# Patient Record
Sex: MALE | Race: BLACK OR AFRICAN AMERICAN | NOT HISPANIC OR LATINO | Employment: STUDENT | URBAN - METROPOLITAN AREA
[De-identification: names, ages, dates, MRNs, and addresses within clinical notes are randomized per-mention and may not be internally consistent; named-entity substitution may affect disease eponyms.]

---
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
## 2025-02-21 ENCOUNTER — HOSPITAL ENCOUNTER (EMERGENCY)
Facility: HOSPITAL | Age: 20
Discharge: HOME | End: 2025-02-21
Attending: INTERNAL MEDICINE
Payer: MEDICAID

## 2025-02-21 VITALS
OXYGEN SATURATION: 98 % | HEART RATE: 116 BPM | TEMPERATURE: 102 F | BODY MASS INDEX: 22.76 KG/M2 | RESPIRATION RATE: 20 BRPM | HEIGHT: 67 IN | DIASTOLIC BLOOD PRESSURE: 63 MMHG | WEIGHT: 145 LBS | SYSTOLIC BLOOD PRESSURE: 105 MMHG

## 2025-02-21 DIAGNOSIS — J10.1 INFLUENZA A: Primary | ICD-10-CM

## 2025-02-21 LAB
CTP QC/QA: YES
INFLUENZA A ANTIGEN, POC: POSITIVE
INFLUENZA B ANTIGEN, POC: NEGATIVE
POC RAPID STREP A: NEGATIVE
SARS-COV-2 RDRP RESP QL NAA+PROBE: NEGATIVE

## 2025-02-21 PROCEDURE — 87635 SARS-COV-2 COVID-19 AMP PRB: CPT | Mod: ER | Performed by: INTERNAL MEDICINE

## 2025-02-21 PROCEDURE — 99284 EMERGENCY DEPT VISIT MOD MDM: CPT | Mod: ER

## 2025-02-21 PROCEDURE — 87804 INFLUENZA ASSAY W/OPTIC: CPT | Mod: ER

## 2025-02-21 PROCEDURE — 25000003 PHARM REV CODE 250: Mod: ER | Performed by: INTERNAL MEDICINE

## 2025-02-21 PROCEDURE — 87880 STREP A ASSAY W/OPTIC: CPT | Mod: ER

## 2025-02-21 RX ORDER — IBUPROFEN 600 MG/1
600 TABLET ORAL EVERY 6 HOURS PRN
Qty: 20 TABLET | Refills: 0 | Status: SHIPPED | OUTPATIENT
Start: 2025-02-21

## 2025-02-21 RX ORDER — ACETAMINOPHEN 500 MG
500 TABLET ORAL EVERY 6 HOURS PRN
Qty: 20 TABLET | Refills: 0 | Status: SHIPPED | OUTPATIENT
Start: 2025-02-21

## 2025-02-21 RX ORDER — CETIRIZINE HYDROCHLORIDE 10 MG/1
10 TABLET ORAL DAILY
Qty: 30 TABLET | Refills: 0 | Status: SHIPPED | OUTPATIENT
Start: 2025-02-21 | End: 2025-03-23

## 2025-02-21 RX ORDER — ACETAMINOPHEN 500 MG
1000 TABLET ORAL
Status: COMPLETED | OUTPATIENT
Start: 2025-02-21 | End: 2025-02-21

## 2025-02-21 RX ORDER — OSELTAMIVIR PHOSPHATE 75 MG/1
75 CAPSULE ORAL 2 TIMES DAILY
Qty: 10 CAPSULE | Refills: 0 | Status: SHIPPED | OUTPATIENT
Start: 2025-02-21 | End: 2025-02-26

## 2025-02-21 RX ORDER — GUAIFENESIN 100 MG/5ML
100-200 SOLUTION ORAL EVERY 4 HOURS PRN
Qty: 60 ML | Refills: 0 | Status: SHIPPED | OUTPATIENT
Start: 2025-02-21 | End: 2025-03-03

## 2025-02-21 RX ORDER — BENZONATATE 100 MG/1
100 CAPSULE ORAL 3 TIMES DAILY PRN
Qty: 20 CAPSULE | Refills: 0 | Status: SHIPPED | OUTPATIENT
Start: 2025-02-21 | End: 2025-03-03

## 2025-02-21 RX ORDER — IBUPROFEN 600 MG/1
600 TABLET ORAL
Status: COMPLETED | OUTPATIENT
Start: 2025-02-21 | End: 2025-02-21

## 2025-02-21 RX ORDER — FLUTICASONE PROPIONATE 50 MCG
1 SPRAY, SUSPENSION (ML) NASAL 2 TIMES DAILY PRN
Qty: 15 G | Refills: 0 | Status: SHIPPED | OUTPATIENT
Start: 2025-02-21

## 2025-02-21 RX ADMIN — IBUPROFEN 600 MG: 600 TABLET, FILM COATED ORAL at 08:02

## 2025-02-21 RX ADMIN — ACETAMINOPHEN 1000 MG: 500 TABLET, FILM COATED ORAL at 08:02

## 2025-02-21 NOTE — Clinical Note
"Dimitrios Rocha" Katie was seen and treated in our emergency department on 2/21/2025.  He may return to work on 02/26/2025.       If you have any questions or concerns, please don't hesitate to call.      Chucky KO RN    "

## 2025-02-21 NOTE — Clinical Note
"Dimitrios Rocha" Katie was seen and treated in our emergency department on 2/21/2025.  He may return to work on 02/25/2025.       If you have any questions or concerns, please don't hesitate to call.      Luis Huang PA-C"

## 2025-02-22 NOTE — ED PROVIDER NOTES
Encounter Date: 2/21/2025       History     Chief Complaint   Patient presents with    Sore Throat     Pt c/o sore throat x 2 days with chest congestion and fatigue     Patient is a 19-year-old male with a past medical history of thrombocytopenia who presents to the emergency department for evaluation of URI symptoms x 2 days.  Symptoms include cough, congestion, fatigue, sore throat, body aches.  Reports known sick contacts, states mom sick with same symptoms.  Up-to-date on vaccinations.  Denies nausea, vomiting, diarrhea.  Denies chest pain or shortness of breath.  No other complaints today.    The history is provided by the patient.     Review of patient's allergies indicates:  No Known Allergies  Past Medical History:   Diagnosis Date    Hypophosphatemia 06/02/2024    Thrombocytopenia 06/02/2024     No past surgical history on file.  No family history on file.  Social History[1]  Review of Systems   Constitutional:  Positive for chills and fever.   HENT:  Positive for congestion, rhinorrhea and sore throat. Negative for ear pain and trouble swallowing.    Respiratory:  Positive for cough. Negative for shortness of breath.    Cardiovascular:  Negative for chest pain.   Gastrointestinal:  Negative for abdominal pain, diarrhea, nausea and vomiting.   Genitourinary:  Negative for decreased urine volume.   Musculoskeletal:  Negative for back pain, neck pain and neck stiffness.   Neurological:  Negative for dizziness, light-headedness and headaches.       Physical Exam     Initial Vitals [02/21/25 2039]   BP Pulse Resp Temp SpO2   105/63 (!) 123 20 (!) 102.8 °F (39.3 °C) 98 %      MAP       --         Physical Exam    Nursing note and vitals reviewed.  Constitutional: He appears well-developed and well-nourished.   HENT:   Head: Normocephalic and atraumatic.   Right Ear: External ear normal.   Left Ear: External ear normal.   There is no posterior oropharyngeal erythema however postnasal drip is present, no tonsillar  swelling, no oropharyngeal exudates, uvula is midline. Normal dentition. No trismus.  No muffled voice. No submandibular swelling. Patient is tolerating secretions without difficulty.  Patient is speaking in full sentences on exam without difficulty.  Bilateral tympanic membranes are pearly gray without erythema, bulging, perforation.  There is no postauricular swelling, or overlying erythema or tenderness to palpation over mastoids bilaterally.    Neck: Carotid bruit is not present.   Normal range of motion.  Cardiovascular:  Regular rhythm, normal heart sounds and intact distal pulses.   Tachycardia present.   Exam reveals no gallop and no friction rub.       No murmur heard.  Pulmonary/Chest: Breath sounds normal. No respiratory distress. He has no wheezes. He has no rhonchi. He has no rales.   Abdominal: Abdomen is soft. Bowel sounds are normal. He exhibits no distension. There is no abdominal tenderness. There is no rebound and no guarding.   Musculoskeletal:         General: Normal range of motion.      Cervical back: Normal range of motion.     Neurological: He is alert and oriented to person, place, and time. GCS score is 15. GCS eye subscore is 4. GCS verbal subscore is 5. GCS motor subscore is 6.   Psychiatric: He has a normal mood and affect.         ED Course   Procedures  Labs Reviewed   POCT RAPID INFLUENZA A/B - Abnormal       Result Value    Influenza B Ag negative      Inflenza A Ag positive (*)    SARS-COV-2 RDRP GENE    POC Rapid COVID Negative       Acceptable Yes      Narrative:     This test utilizes isothermal nucleic acid amplification technology to detect the SARS-CoV-2 RdRp nucleic acid segment. The analytical sensitivity (limit of detection) is 500 copies/swab.     A POSITIVE result is indicative of the presence of SARS-CoV-2 RNA; clinical correlation with patient history and other diagnostic information is necessary to determine patient infection status.    A NEGATIVE  result means that SARS-CoV-2 nucleic acids are not present above the limit of detection. A NEGATIVE result should be treated as presumptive. It does not rule out the possibility of COVID-19 and should not be the sole basis for treatment decisions. If COVID-19 is strongly suspected based on clinical and exposure history, re-testing using an alternate molecular assay should be considered.     Commercial kits are provided by [employer].       POCT STREP A MOLECULAR   POCT INFLUENZA A/B MOLECULAR   POCT STREP A, RAPID    POC Rapid Strep A negative            Imaging Results    None          Medications   acetaminophen tablet 1,000 mg (1,000 mg Oral Given 2/21/25 2049)   ibuprofen tablet 600 mg (600 mg Oral Given 2/21/25 2048)     Medical Decision Making  This is an emergent evaluation of a 19-year-old male with a past medical history of thrombocytopenia who presents to the emergency department for evaluation of URI symptoms x 2 days.    Patient looks well clinically. There is no posterior oropharyngeal erythema however postnasal drip is present, no tonsillar swelling, no oropharyngeal exudates, uvula is midline. Normal dentition. No trismus.  No muffled voice. No submandibular swelling. Patient is tolerating secretions without difficulty.  Patient is speaking in full sentences on exam without difficulty.  Bilateral tympanic membranes are pearly gray without erythema, bulging, perforation.  There is no postauricular swelling, or overlying erythema or tenderness to palpation over mastoids bilaterally.  Regular rhythm, slightly tachycardic, no murmurs.  No carotid bruits appreciated on exam. Lungs are clear to auscultation bilaterally.  Abdomen is soft, nontender, non distended, with normal bowel sounds.     Differential diagnosis includes but is not limited to COVID, flu, strep, other viral syndrome.  Considered but doubt pneumonia, peritonsillar abscess, Ron's angina.    Workup initiated with viral swabs.   Tylenol, Motrin ordered from triage.  Vital signs, chart, labs, and/or imaging were all reviewed.  See ED course below and interpretations above. My overall impression is influenza. Will discharge home with Tamiflu, other symptomatic medications. Patient is very well appearing, and in no acute distress. Vital signs are reassuring here in the emergency department, patient is afebrile, breathing comfortable, satting 98 % on room air. Patient/Caregiver is stable for discharge at this time.  Patient/Caregiver was informed of results and plan of care. Patient/Caregiver verbalized understanding of care plan. All questions and concerns were addressed. Discussed strict return precautions with the patient/caregiver. Instructed follow up with primary care provider within 1 week.      Luis Huang PA-C    DISCLAIMER: This note was prepared with [employer] voice recognition transcription software. Garbled syntax, mangled pronouns, and other bizarre constructions may be attributed to that software system.       Amount and/or Complexity of Data Reviewed  Labs: ordered. Decision-making details documented in ED Course.    Risk  OTC drugs.  Prescription drug management.               ED Course as of 02/21/25 2130 Fri Feb 21, 2025 2050 BP: 105/63 [TM]   2050 Temp(!): 102.8 °F (39.3 °C) [TM]   2050 Pulse(!): 123 [TM]   2050 Resp: 20 [TM]   2050 SpO2: 98 % [TM]   2058 POCT Rapid Strep A  Negative. [TM]   2105 POCT Rapid Influenza A/B(!)  Influenza A positive. [TM]   2111 POCT COVID-19 Rapid Screening  COVID negative [TM]   2119 Temp(!): 102.3 °F (39.1 °C)  Trending down, patient comfortable checking at home. Feels better. [TM]      ED Course User Index  [TM] Luis Huang PA-C                           Clinical Impression:  Final diagnoses:  [J10.1] Influenza A (Primary)          ED Disposition Condition    Discharge Stable          ED Prescriptions       Medication Sig Dispense Start Date End Date Auth. Provider    acetaminophen  (TYLENOL) 500 MG tablet Take 1 tablet (500 mg total) by mouth every 6 (six) hours as needed. 20 tablet 2/21/2025 -- Luis Huang PA-C    ibuprofen (ADVIL,MOTRIN) 600 MG tablet Take 1 tablet (600 mg total) by mouth every 6 (six) hours as needed for Pain. 20 tablet 2/21/2025 -- Luis Huang PA-C    cetirizine (ZYRTEC) 10 MG tablet Take 1 tablet (10 mg total) by mouth once daily. 30 tablet 2/21/2025 3/23/2025 Luis Huang PA-C    fluticasone propionate (FLONASE) 50 mcg/actuation nasal spray 1 spray (50 mcg total) by Each Nostril route 2 (two) times daily as needed for Rhinitis. 15 g 2/21/2025 -- Luis Huang PA-C    benzonatate (TESSALON) 100 MG capsule Take 1 capsule (100 mg total) by mouth 3 (three) times daily as needed. 20 capsule 2/21/2025 3/3/2025 Luis Huang PA-C    guaiFENesin 100 mg/5 ml (ROBITUSSIN) 100 mg/5 mL syrup Take 5-10 mLs (100-200 mg total) by mouth every 4 (four) hours as needed for Cough. 60 mL 2/21/2025 3/3/2025 Luis Huang PA-C    oseltamivir (TAMIFLU) 75 MG capsule Take 1 capsule (75 mg total) by mouth 2 (two) times daily. for 5 days 10 capsule 2/21/2025 2/26/2025 Luis Huang PA-C          Follow-up Information       Follow up With Specialties Details Why Contact Info    [facility] ED Emergency Medicine Go to  As needed, If symptoms worsen, or new symptoms develop [address] [address] [phone]    Primary care doctor  Schedule an appointment as soon as possible for a visit in 3 days                 [1]   Social History  Tobacco Use    Smoking status: Never     Passive exposure: Never    Smokeless tobacco: Never   Substance Use Topics    Drug use: Never        Luis Huang PA-C  02/21/25 5761

## 2025-04-09 ENCOUNTER — HOSPITAL ENCOUNTER (EMERGENCY)
Facility: HOSPITAL | Age: 20
Discharge: HOME | End: 2025-04-09
Attending: STUDENT IN AN ORGANIZED HEALTH CARE EDUCATION/TRAINING PROGRAM
Payer: MEDICAID

## 2025-04-09 VITALS
RESPIRATION RATE: 18 BRPM | BODY MASS INDEX: 20.09 KG/M2 | DIASTOLIC BLOOD PRESSURE: 77 MMHG | HEART RATE: 82 BPM | OXYGEN SATURATION: 99 % | SYSTOLIC BLOOD PRESSURE: 127 MMHG | WEIGHT: 125 LBS | TEMPERATURE: 98 F | HEIGHT: 66 IN

## 2025-04-09 DIAGNOSIS — K05.20 ACUTE PERICORONITIS: Primary | ICD-10-CM

## 2025-04-09 PROCEDURE — 25000003 PHARM REV CODE 250: Mod: ER | Performed by: STUDENT IN AN ORGANIZED HEALTH CARE EDUCATION/TRAINING PROGRAM

## 2025-04-09 PROCEDURE — 99283 EMERGENCY DEPT VISIT LOW MDM: CPT | Mod: ER

## 2025-04-09 RX ORDER — IBUPROFEN 600 MG/1
600 TABLET ORAL
Status: COMPLETED | OUTPATIENT
Start: 2025-04-09 | End: 2025-04-09

## 2025-04-09 RX ORDER — AMOXICILLIN 875 MG/1
875 TABLET, FILM COATED ORAL 2 TIMES DAILY
Qty: 14 TABLET | Refills: 0 | Status: SHIPPED | OUTPATIENT
Start: 2025-04-09

## 2025-04-09 RX ORDER — IBUPROFEN 600 MG/1
600 TABLET ORAL EVERY 6 HOURS PRN
Qty: 20 TABLET | Refills: 0 | Status: SHIPPED | OUTPATIENT
Start: 2025-04-09

## 2025-04-09 RX ORDER — AMOXICILLIN 250 MG/1
500 CAPSULE ORAL
Status: COMPLETED | OUTPATIENT
Start: 2025-04-09 | End: 2025-04-09

## 2025-04-09 RX ORDER — ACETAMINOPHEN 325 MG/1
650 TABLET ORAL
Status: COMPLETED | OUTPATIENT
Start: 2025-04-09 | End: 2025-04-09

## 2025-04-09 RX ADMIN — IBUPROFEN 600 MG: 600 TABLET ORAL at 02:04

## 2025-04-09 RX ADMIN — AMOXICILLIN 500 MG: 250 CAPSULE ORAL at 02:04

## 2025-04-09 RX ADMIN — ACETAMINOPHEN 650 MG: 325 TABLET ORAL at 02:04

## 2025-04-09 NOTE — ED PROVIDER NOTES
Encounter Date: 4/9/2025       History     Chief Complaint   Patient presents with    Dental Pain     C/O RIGHT SIDED DENTAL PAIN X 2 DAYS     19-year-old male presents for pain and swelling along the jaw around the right rear molar.  He has had dental problems in the past but the pain worsened over the past few days.  He has not yet made an appointment to see a dentist.  Last took ibuprofen about 6:00 p.m with some relief..      Review of patient's allergies indicates:  No Known Allergies  Past Medical History:   Diagnosis Date    Hypophosphatemia 06/02/2024    Thrombocytopenia 06/02/2024     No past surgical history on file.  No family history on file.  Social History[1]  Review of Systems   HENT:  Positive for dental problem.        Physical Exam     Initial Vitals [04/09/25 0146]   BP Pulse Resp Temp SpO2   127/77 82 18 98 °F (36.7 °C) 99 %      MAP       --         Physical Exam    Nursing note and vitals reviewed.  Constitutional: He appears well-developed and well-nourished.   HENT:   Head: Normocephalic and atraumatic. Mouth/Throat: Oropharynx is clear and moist.   Partially erupted wisdom tooth of the right rear, Swelling, poor dentition in the right rear molar consistent pericoronitis   Eyes: Conjunctivae and EOM are normal. Pupils are equal, round, and reactive to light.   Neck: No thyromegaly present.   Normal range of motion.  Cardiovascular:  Normal rate, regular rhythm and intact distal pulses.           Pulmonary/Chest: Breath sounds normal. No respiratory distress. He has no wheezes.   Abdominal: Abdomen is soft. Bowel sounds are normal. He exhibits no distension. There is no abdominal tenderness.   Musculoskeletal:         General: No tenderness or edema. Normal range of motion.      Cervical back: Normal range of motion.     Neurological: He is alert and oriented to person, place, and time. He has normal strength. No cranial nerve deficit.   Skin: Skin is warm and dry. No rash noted.    Psychiatric: He has a normal mood and affect. His behavior is normal. Thought content normal.         ED Course   Procedures  Labs Reviewed - No data to display       Imaging Results    None          Medications   amoxicillin capsule 500 mg (has no administration in time range)   acetaminophen tablet 650 mg (has no administration in time range)   ibuprofen tablet 600 mg (has no administration in time range)     Medical Decision Making  19-year-old male presents with dental pain.  Vitals normal.  Exam notable for partially erupted right rear wisdom tooth and adjacent gum swelling consistent with pericoronitis.  Patient has no trismus, voice is normal, tolerating secretions, doubt other infection at this time.  Stable for discharge with amoxicillin, ibuprofen, dentist follow up.    Risk  OTC drugs.  Prescription drug management.                                      Clinical Impression:  Final diagnoses:  [K05.20] Acute pericoronitis (Primary)          ED Disposition Condition    Discharge Stable          ED Prescriptions       Medication Sig Dispense Start Date End Date Auth. Provider    amoxicillin (AMOXIL) 875 MG tablet Take 1 tablet (875 mg total) by mouth 2 (two) times daily. 14 tablet 4/9/2025 -- Roosevelt Salgado MD    ibuprofen (ADVIL,MOTRIN) 600 MG tablet Take 1 tablet (600 mg total) by mouth every 6 (six) hours as needed for Pain. 20 tablet 4/9/2025 -- Roosevelt Salgado MD          Follow-up Information    None            [1]   Social History  Tobacco Use    Smoking status: Never     Passive exposure: Never    Smokeless tobacco: Never   Substance Use Topics    Drug use: Never        Roosevelt Salgado MD  04/09/25 0206

## 2025-04-09 NOTE — Clinical Note
"Dimitrios Rocha" Katie was seen and treated in our emergency department on 4/9/2025.  He may return to work on 04/10/2025.       If you have any questions or concerns, please don't hesitate to call.      Michelet Mott RN    "

## 2025-05-13 ENCOUNTER — HOSPITAL ENCOUNTER (EMERGENCY)
Facility: HOSPITAL | Age: 20
Discharge: HOME | End: 2025-05-13
Attending: EMERGENCY MEDICINE

## 2025-05-13 VITALS
TEMPERATURE: 98 F | HEIGHT: 66 IN | HEART RATE: 80 BPM | SYSTOLIC BLOOD PRESSURE: 106 MMHG | RESPIRATION RATE: 17 BRPM | WEIGHT: 150 LBS | DIASTOLIC BLOOD PRESSURE: 67 MMHG | OXYGEN SATURATION: 98 % | BODY MASS INDEX: 24.11 KG/M2

## 2025-05-13 DIAGNOSIS — K62.5 RECTAL BLEEDING: Primary | ICD-10-CM

## 2025-05-13 PROCEDURE — 99284 EMERGENCY DEPT VISIT MOD MDM: CPT | Mod: ER

## 2025-05-13 RX ORDER — POLYETHYLENE GLYCOL 3350 17 G/17G
17 POWDER, FOR SOLUTION ORAL DAILY
Qty: 20 EACH | Refills: 0 | Status: SHIPPED | OUTPATIENT
Start: 2025-05-13

## 2025-05-13 RX ORDER — HYDROCORTISONE 25 MG/G
CREAM TOPICAL 2 TIMES DAILY
Qty: 20 G | Refills: 0 | Status: SHIPPED | OUTPATIENT
Start: 2025-05-13

## 2025-05-13 NOTE — ED PROVIDER NOTES
Encounter Date: 5/13/2025       History     Chief Complaint   Patient presents with    Abdominal Pain     A 18 y/o male presents to the ER c/o abdominal pain accompanied with hematochezia x 3 days.  Denies trauma  Afebrile and VSS     A 19-year-old male with no past medical history presents to the emergency department complaining of rectal bleeding for the past 3 days.  Patient is not complaining of abdominal pain, nausea, vomiting, dizziness, lightheadedness, syncope, pain in the rectum.  Patient states he noticed blood on the toilet paper and in the stool in the toilet bowl.  Patient denies any foreign objects in the rectum.  Patient denies constipation.  Patient states that he spends a lot of time sitting on the toilet.  Denies fever, chest pain, shortness of breath, nausea, vomiting, syncope.  No other complaints at this time.        Review of patient's allergies indicates:  No Known Allergies  Past Medical History:   Diagnosis Date    Hypophosphatemia 06/02/2024    Thrombocytopenia 06/02/2024     No past surgical history on file.  No family history on file.  Social History[1]  Review of Systems   Constitutional:  Negative for fever.   HENT:  Negative for sore throat.    Respiratory:  Negative for shortness of breath.    Cardiovascular:  Negative for chest pain.   Gastrointestinal:  Positive for anal bleeding and blood in stool. Negative for abdominal distention, abdominal pain, constipation, diarrhea, nausea and vomiting.   Genitourinary:  Negative for dysuria.   Musculoskeletal:  Negative for back pain.   Skin:  Negative for rash.   Neurological:  Negative for dizziness, syncope, weakness, light-headedness and headaches.   Hematological:  Does not bruise/bleed easily.       Physical Exam     Initial Vitals [05/13/25 1030]   BP Pulse Resp Temp SpO2   99/60 86 18 98.7 °F (37.1 °C) 100 %      MAP       --         Physical Exam    Constitutional: He appears well-developed and well-nourished.  Non-toxic appearance.  He does not have a sickly appearance. No distress.   HENT:   Head: Normocephalic and atraumatic.   Right Ear: External ear normal.   Left Ear: External ear normal. Mouth/Throat: No trismus in the jaw.   Eyes: Conjunctivae and lids are normal.   Neck: Trachea normal. Neck supple. No tracheal deviation present.   Normal range of motion.  Cardiovascular:  Normal rate, regular rhythm, S1 normal, S2 normal, normal heart sounds and normal pulses.     Exam reveals no S3 and no S4.       Pulmonary/Chest: Effort normal and breath sounds normal. No tachypnea and no bradypnea. No respiratory distress. He has no decreased breath sounds. He has no wheezes. He has no rhonchi. He has no rales.   Abdominal: Abdomen is soft. Bowel sounds are normal. He exhibits no distension. There is no abdominal tenderness.   No right CVA tenderness.  No left CVA tenderness. There is no rebound, no guarding, no tenderness at McBurney's point and negative Underwood's sign.   Genitourinary: Rectum:      Guaiac result positive.      External hemorrhoid present.      No anal fissure, tenderness, internal hemorrhoid or abnormal anal tone.   Guaiac positive stool.    Genitourinary Comments: Hemoccult test positive.  Very small external hemorrhoid on physical exam.  No pain or discoloration.     Musculoskeletal:      Cervical back: Normal range of motion and neck supple.      Comments: Patient is able to move all extremities. 5/5 strength at upper and lower extremities.      Neurological: He is alert and oriented to person, place, and time. GCS eye subscore is 4. GCS verbal subscore is 5. GCS motor subscore is 6.   Skin: Skin is warm and dry. No rash noted.   Psychiatric: He has a normal mood and affect. His behavior is normal. Judgment and thought content normal.         ED Course   Procedures  Labs Reviewed - No data to display       Imaging Results    None          Medications - No data to display  Medical Decision Making  Encounter Date: 5/13/2025    19  y.o. male presents for evaluation of rectal bleeding for the past 3 days.  Hemodynamically stable. Afebrile. Phonating and protecting the airway spontaneously. No clinical evidence for cardiovascular instability or impending airway compromise.  Remainder of physical exam as above.   Prior medical records reviewed. PMD note reviewed. Current co-morbidities considered that will impact clinical decision making include as above.   Vitals range:   Temp:  [98.3 °F (36.8 °C)-98.7 °F (37.1 °C)] 98.3 °F (36.8 °C)  Pulse:  [80-86] 80  Resp:  [17-18] 17  SpO2:  [98 %-100 %] 98 %  BP: ()/(60-67) 106/67    Differential diagnoses includes but is not limited to: External hemorrhoids, internal hemorrhoids, thrombosed hemorrhoids, anal fissure, GI bleed     Rectal exam performed in the emergency department by myself and chaperoned by RN. Small external hemorrhoid present.  No mass present.  No tenderness.  No internal hemorrhoids palpated.  No anal fissures.  Normal anal tone.  Patient denies painful defecation or pain in the rectum.  No evidence of thrombosed hemorrhoid on rectal exam.  No anal fissures present on rectal exam.  No abdominal tenderness.  Patient denies nausea, vomiting.  No melena.  I have low suspicion for GI bleed.     Patient was educated on causes of hemorrhoids.  I advised patient to avoid spending large amounts of times on the toilet.  I also advised patient to increase fluid intake and fiber in his diet.  I prescribed patient MiraLax and topical hydrocortisone cream for hemorrhoids.  I referred patient to primary care and GI doctor for further evaluation.     Clinical picture today most consistent with external hemorrhoids.   Doubt alternate pathology as listed in the differential above.    ED MEDS GIVEN:  Medications - No data to display    Clinical Impression:  Final diagnoses:  [K62.5] Rectal bleeding (Primary)    I discussed with the patient/family the diagnosis, treatment plan, indications for  return to the emergency department, and for expected follow-up. The patient/family verbalized an understanding. The patient/family is asked if there are any questions or concerns. We discuss the case, until all issues are addressed to the patient/family's satisfaction. Patient/family understands and is agreeable to the plan.   Dez Woods    DISCLAIMER: This note was prepared with [employer] voice recognition transcription software.       Risk  OTC drugs.  Prescription drug management.                                      Clinical Impression:  Final diagnoses:  [K62.5] Rectal bleeding (Primary)          ED Disposition Condition    Discharge Stable          ED Prescriptions       Medication Sig Dispense Start Date End Date Auth. Provider    hydrocortisone 2.5 % cream Apply topically 2 (two) times daily. 20 g 5/13/2025 -- Dez Woods PA-C    polyethylene glycol (GLYCOLAX) 17 gram PwPk Take 17 g by mouth once daily. 20 each 5/13/2025 -- Dez Woods PA-C          Follow-up Information       Follow up With Specialties Details Why Contact [facility] ED Emergency Medicine Go to  As needed, If symptoms worsen [address] [facility] [address] [phone]    [facility] GASTROENTEROLOGY Gastroenterology Schedule an appointment as soon as possible for a visit in 1 day For further evaluation, more definitive management of symptoms [address] [facility] [place] [address] [phone]               [1]   Social History  Tobacco Use    Smoking status: Never     Passive exposure: Never    Smokeless tobacco: Never   Substance Use Topics    Drug use: Never        Dez Woods PA-C  05/13/25 5990

## 2025-05-13 NOTE — DISCHARGE INSTRUCTIONS
Use medication as prescribed.  Make sure to stay hydrated and incorporate fiber in your diet.  Please avoid spending excessive amounts of time on the toilet. Use Sitz baths for symptomatic relief.  Follow up with primary care doctor and GI doctor.  Thank you for coming to our Emergency Department today. It is important to remember that some problems or medical conditions are difficult to diagnose and may not be found or addressed during your Emergency Department visit.  These conditions often start with non-specific symptoms and can only be diagnosed on follow up visits with your primary care physician or specialist when the symptoms continue or change. Please remember that all medical conditions can change, and we cannot predict how you will be feeling tomorrow or the next day. Return to the ER with any questions/concerns, new/concerning symptoms, worsening or failure to improve.       Be sure to follow up with your primary care doctor and review all labs/imaging/tests that were performed during your ER visit with them. It is very common for us to identify non-emergent incidental findings which must be followed up with your primary care physician.  Some labs/imaging/tests may be outside of the normal range, and require non-emergent follow-up and/or further investigation/treatment/procedures/testing to help diagnose/exclude/prevent complications or other potentially serious medical conditions. Some abnormalities may not have been discussed or addressed during your ER visit. Some lab results may not return during your ER visit but can be accessible by downloading the free [facility] trae or by visiting [email or web address] . It is important for you to review all labs/imaging/tests which are outside of the normal range with your physician.    An ER visit does not replace a primary care visit, and many screening tests or follow-up tests cannot be ordered by an ER doctor or performed by the ER. Some tests may  even require pre-approval.    If you do not have a primary care doctor, you may contact the one listed on your discharge paperwork or you may also call the [facility] Appointment Desk at [phone] , or [address] [place] [address] at  [phone] to schedule an appointment, or establish care with a primary care doctor or even a specialist and to obtain information about local resources. It is important to your health that you have a primary care doctor.    Please take all medications as directed. We have done our best to select a medication for you that will treat your condition however, all medications may potentially have side-effects and it is impossible to predict which medications may give you side-effects or what those side-effects (if any) those medications may give you.  If you feel that you are having a negative effect or side-effect of any medication you should stop taking those medications immediately and seek medical attention. If you feel that you are having a life-threatening reaction call 911.        Do not drive, swim, climb to height, take a bath, operate heavy machinery, drink alcohol or take potentially sedating medications, sign any legal documents or make any important decisions for 24 hours if you have received any pain medications, sedatives or mood altering drugs during your ER visit or within 24 hours of taking them if they have been prescribed to you.     You can find additional resources for Dentists, hearing aids, durable medical equipment, low cost pharmacies and other resources at [email or web address]

## 2025-06-04 ENCOUNTER — PATIENT MESSAGE (OUTPATIENT)
Dept: ADMINISTRATIVE | Facility: OTHER | Age: 20
End: 2025-06-04

## 2025-08-06 ENCOUNTER — TELEPHONE (OUTPATIENT)
Dept: FAMILY MEDICINE | Facility: CLINIC | Age: 20
End: 2025-08-06
Payer: MEDICAID

## 2025-08-06 NOTE — TELEPHONE ENCOUNTER
Spoke with the patient. Informed him that Dr. Hidalgo is not accepting new patient from Medicaid currently and requested to cancel the appointment and schedule an office visit with different provider to establish the care. Patient was reminded about the message from [facility] regarding any assistance  to establish care with a provider and provided the contact number. Patient hung up between the call.